# Patient Record
Sex: FEMALE | URBAN - METROPOLITAN AREA
[De-identification: names, ages, dates, MRNs, and addresses within clinical notes are randomized per-mention and may not be internally consistent; named-entity substitution may affect disease eponyms.]

---

## 2023-08-30 LAB
ALANINE AMINOTRANSFERASE (SGPT) (U/L) IN SER/PLAS: 18 U/L (ref 7–45)
ALBUMIN (G/DL) IN SER/PLAS: 4.1 G/DL (ref 3.4–5)
ALKALINE PHOSPHATASE (U/L) IN SER/PLAS: 118 U/L (ref 33–136)
ANION GAP IN SER/PLAS: 13 MMOL/L (ref 10–20)
ASPARTATE AMINOTRANSFERASE (SGOT) (U/L) IN SER/PLAS: 22 U/L (ref 9–39)
BILIRUBIN TOTAL (MG/DL) IN SER/PLAS: 1 MG/DL (ref 0–1.2)
CALCIUM (MG/DL) IN SER/PLAS: 10.1 MG/DL (ref 8.6–10.3)
CARBON DIOXIDE, TOTAL (MMOL/L) IN SER/PLAS: 30 MMOL/L (ref 21–32)
CHLORIDE (MMOL/L) IN SER/PLAS: 105 MMOL/L (ref 98–107)
CHOLESTEROL (MG/DL) IN SER/PLAS: 165 MG/DL (ref 0–199)
CHOLESTEROL IN HDL (MG/DL) IN SER/PLAS: 49.2 MG/DL
CHOLESTEROL/HDL RATIO: 3.4
CREATININE (MG/DL) IN SER/PLAS: 0.86 MG/DL (ref 0.5–1.05)
DIGOXIN (NG/ML) IN SER/PLAS: 0.55 NG/ML (ref 0.8–2)
GFR FEMALE: 68 ML/MIN/1.73M2
GLUCOSE (MG/DL) IN SER/PLAS: 124 MG/DL (ref 74–99)
LDL: 93 MG/DL (ref 0–99)
POTASSIUM (MMOL/L) IN SER/PLAS: 4.5 MMOL/L (ref 3.5–5.3)
PROTEIN TOTAL: 7.1 G/DL (ref 6.4–8.2)
SODIUM (MMOL/L) IN SER/PLAS: 143 MMOL/L (ref 136–145)
TRIGLYCERIDE (MG/DL) IN SER/PLAS: 112 MG/DL (ref 0–149)
UREA NITROGEN (MG/DL) IN SER/PLAS: 15 MG/DL (ref 6–23)
VLDL: 22 MG/DL (ref 0–40)

## 2023-10-01 DIAGNOSIS — I48.91 ATRIAL FIBRILLATION, UNSPECIFIED TYPE (MULTI): Primary | ICD-10-CM

## 2023-10-02 PROBLEM — I10 ESSENTIAL (PRIMARY) HYPERTENSION: Status: ACTIVE | Noted: 2023-10-02

## 2023-10-02 PROBLEM — I48.91 ATRIAL FIBRILLATION (MULTI): Status: ACTIVE | Noted: 2023-10-02

## 2023-10-02 PROBLEM — E78.2 MIXED HYPERLIPIDEMIA: Status: ACTIVE | Noted: 2023-10-02

## 2023-10-02 RX ORDER — ATORVASTATIN CALCIUM 80 MG/1
80 TABLET, FILM COATED ORAL DAILY
COMMUNITY
Start: 2015-04-07

## 2023-10-02 RX ORDER — CLOBETASOL PROPIONATE 0.5 MG/G
CREAM TOPICAL
COMMUNITY

## 2023-10-02 RX ORDER — DIGOXIN 125 MCG
125 TABLET ORAL EVERY OTHER DAY
Qty: 50 TABLET | Refills: 2 | Status: SHIPPED | OUTPATIENT
Start: 2023-10-02 | End: 2024-02-14 | Stop reason: SDUPTHER

## 2023-10-02 RX ORDER — DILTIAZEM HYDROCHLORIDE 300 MG/1
300 CAPSULE, EXTENDED RELEASE ORAL DAILY
COMMUNITY
Start: 2023-01-23 | End: 2023-12-18

## 2023-10-02 RX ORDER — APIXABAN 5 MG/1
5 TABLET, FILM COATED ORAL 2 TIMES DAILY
COMMUNITY
Start: 2020-01-14 | End: 2024-01-08 | Stop reason: SDUPTHER

## 2023-10-02 RX ORDER — FLUTICASONE PROPIONATE 50 MCG
1 SPRAY, SUSPENSION (ML) NASAL DAILY
COMMUNITY
Start: 2015-04-07

## 2023-10-02 RX ORDER — MINERAL OIL
1 ENEMA (ML) RECTAL DAILY
COMMUNITY

## 2023-10-02 RX ORDER — DIGOXIN 125 MCG
125 TABLET ORAL DAILY
COMMUNITY
Start: 2022-03-28 | End: 2024-02-14

## 2023-12-15 DIAGNOSIS — I48.91 ATRIAL FIBRILLATION, UNSPECIFIED TYPE (MULTI): ICD-10-CM

## 2023-12-15 DIAGNOSIS — I10 ESSENTIAL (PRIMARY) HYPERTENSION: Primary | ICD-10-CM

## 2023-12-18 RX ORDER — METOPROLOL TARTRATE 50 MG/1
50 TABLET ORAL
Qty: 200 TABLET | Refills: 2 | Status: SHIPPED | OUTPATIENT
Start: 2023-12-18 | End: 2024-03-04 | Stop reason: SDUPTHER

## 2023-12-18 RX ORDER — DILTIAZEM HYDROCHLORIDE 300 MG/1
300 CAPSULE, COATED, EXTENDED RELEASE ORAL DAILY
Qty: 100 CAPSULE | Refills: 2 | Status: SHIPPED | OUTPATIENT
Start: 2023-12-18 | End: 2024-03-04 | Stop reason: SDUPTHER

## 2024-01-08 ENCOUNTER — TELEPHONE (OUTPATIENT)
Dept: CARDIOLOGY | Facility: CLINIC | Age: 79
End: 2024-01-08

## 2024-01-08 DIAGNOSIS — I48.20 CHRONIC ATRIAL FIBRILLATION (MULTI): Primary | ICD-10-CM

## 2024-02-14 ENCOUNTER — TELEPHONE (OUTPATIENT)
Dept: CARDIOLOGY | Facility: CLINIC | Age: 79
End: 2024-02-14

## 2024-02-14 DIAGNOSIS — I48.91 ATRIAL FIBRILLATION, UNSPECIFIED TYPE (MULTI): ICD-10-CM

## 2024-02-14 RX ORDER — DIGOXIN 125 MCG
125 TABLET ORAL EVERY OTHER DAY
Qty: 45 TABLET | Refills: 1 | Status: SHIPPED | OUTPATIENT
Start: 2024-02-14

## 2024-03-04 ENCOUNTER — TELEPHONE (OUTPATIENT)
Dept: CARDIOLOGY | Facility: CLINIC | Age: 79
End: 2024-03-04

## 2024-03-04 DIAGNOSIS — I48.91 ATRIAL FIBRILLATION, UNSPECIFIED TYPE (MULTI): ICD-10-CM

## 2024-03-04 DIAGNOSIS — I10 ESSENTIAL (PRIMARY) HYPERTENSION: ICD-10-CM

## 2024-03-04 RX ORDER — DILTIAZEM HYDROCHLORIDE 300 MG/1
300 CAPSULE, COATED, EXTENDED RELEASE ORAL DAILY
Qty: 90 CAPSULE | Refills: 1 | Status: SHIPPED | OUTPATIENT
Start: 2024-03-04 | End: 2024-03-06 | Stop reason: SDUPTHER

## 2024-03-04 RX ORDER — METOPROLOL TARTRATE 50 MG/1
50 TABLET ORAL
Qty: 180 TABLET | Refills: 1 | Status: SHIPPED | OUTPATIENT
Start: 2024-03-04 | End: 2024-03-06 | Stop reason: SDUPTHER

## 2024-03-06 RX ORDER — METOPROLOL TARTRATE 50 MG/1
50 TABLET ORAL
Qty: 180 TABLET | Refills: 1 | Status: SHIPPED | OUTPATIENT
Start: 2024-03-06

## 2024-03-06 RX ORDER — DILTIAZEM HYDROCHLORIDE 300 MG/1
300 CAPSULE, COATED, EXTENDED RELEASE ORAL DAILY
Qty: 90 CAPSULE | Refills: 1 | Status: SHIPPED | OUTPATIENT
Start: 2024-03-06

## 2024-06-25 ENCOUNTER — TELEPHONE (OUTPATIENT)
Dept: CARDIOLOGY | Facility: CLINIC | Age: 79
End: 2024-06-25

## 2024-07-16 DIAGNOSIS — I48.20 CHRONIC ATRIAL FIBRILLATION (MULTI): ICD-10-CM

## 2024-07-16 RX ORDER — APIXABAN 5 MG/1
5 TABLET, FILM COATED ORAL 2 TIMES DAILY
Qty: 60 TABLET | Refills: 0 | Status: SHIPPED | OUTPATIENT
Start: 2024-07-16

## 2024-07-23 ENCOUNTER — OFFICE VISIT (OUTPATIENT)
Dept: CARDIOLOGY | Facility: CLINIC | Age: 79
End: 2024-07-23
Payer: COMMERCIAL

## 2024-07-23 VITALS — HEART RATE: 85 BPM | SYSTOLIC BLOOD PRESSURE: 156 MMHG | OXYGEN SATURATION: 95 % | DIASTOLIC BLOOD PRESSURE: 84 MMHG

## 2024-07-23 DIAGNOSIS — I87.2 CHRONIC VENOUS INSUFFICIENCY: Primary | ICD-10-CM

## 2024-07-23 DIAGNOSIS — I89.0 LYMPHEDEMA: ICD-10-CM

## 2024-07-23 PROCEDURE — 99213 OFFICE O/P EST LOW 20 MIN: CPT | Performed by: INTERNAL MEDICINE

## 2024-07-23 PROCEDURE — 1159F MED LIST DOCD IN RCRD: CPT | Performed by: INTERNAL MEDICINE

## 2024-07-23 PROCEDURE — 3077F SYST BP >= 140 MM HG: CPT | Performed by: INTERNAL MEDICINE

## 2024-07-23 PROCEDURE — 1160F RVW MEDS BY RX/DR IN RCRD: CPT | Performed by: INTERNAL MEDICINE

## 2024-07-23 PROCEDURE — 3079F DIAST BP 80-89 MM HG: CPT | Performed by: INTERNAL MEDICINE

## 2024-07-23 NOTE — PATIENT INSTRUCTIONS
I am referring you to lymphedema therapy, also known as complex decongestive therapy. Lymphedema therapy may include wrapping or bandaging of the legs to reduce swelling along with range of motion and breathing exercises and a light massage technique called manual lymphatic drainage that helps move fluid.      [facility] #[phone]  [id number] [address]  -upper and lower extremity, non-bariatric  -closed wounds    I want you to get more physically active. The CDC recommends 150 minutes of moderate intensity activity per week, broken up however you want to break it up. Moderate activity would be brisk walking, for instance. Most people find that 30 minutes five days a week works for them. Some people who work walk 15 minutes at their lunch break and 15 minutes in the evening. You can be creative.    Activities like swimming, biking, and running qualify as intense physical activity, in which case you only need to do 75 minutes a week.    Should you have questions, please do not hesitate to call my office at [phone], or you can reach me on [employer] if you have signed up for it. If you have urgent concerns, call the office, do not use [employer].

## 2024-07-23 NOTE — PROGRESS NOTES
Referred by self for chronic leg swelling    History of Present Illness:  Keesha Alberto is a/an 79 y.o. woman with chronic leg swelling who noticed worsening in the last several months. It's better first thing in the morning and worsens progressively throughout the day. She denies ulceration, weeping, and drainage.    She has a remote history of right leg DVT after hip replacement. Also with hypertension, hypothyroidism, atrial fibrillation on chronic anticoagulation. She has dealt with swelling intermittently for years. Used to see Dr. Alesha Davis in vascular medicine at the [facility] and was referred to complex decongestive therapy (lymphedema therapy).    She is wearing knee-high compression socks daily. She has done MLD in the past. She is pretty sedentary.       No past medical history on file.  No past surgical history on file.     No family history on file.  Current Outpatient Medications   Medication Sig Dispense Refill    atorvastatin (Lipitor) 80 mg tablet Take 1 tablet (80 mg) by mouth once daily.      clobetasol (Temovate) 0.05 % cream APPLY TWICE DAILY TO RASH ON ABDOMEN AND BACK      digoxin (Lanoxin) 125 MCG tablet Take 1 tablet (125 mcg) by mouth every other day. 45 tablet 1    dilTIAZem CD (Cardizem CD) 300 mg 24 hr capsule Take 1 capsule (300 mg) by mouth once daily. 90 capsule 1    Eliquis 5 mg tablet TAKE 1 TABLET BY MOUTH TWICE DAILY 60 tablet 0    fexofenadine (Allegra) 180 mg tablet Take 1 tablet (180 mg) by mouth once daily.      fluticasone (Flonase) 50 mcg/actuation nasal spray Administer 1 spray into each nostril once daily.      metoprolol tartrate (Lopressor) 50 mg tablet Take 1 tablet by mouth 2 times a day with meals. 180 tablet 1     No current facility-administered medications for this visit.       Physical Examination:  Blood pressure 156/84, pulse 85, SpO2 95%.  General: well-developed, well-nourished, no distress  Head: normocephalic, atraumatic  Eyes: PERRL, anicteric  sclerae, no conjunctival injection  ENT: normal oropharynx  Neck: supple, no carotid bruits, no JVD  Lungs: normal respiratory effort, clear to auscultation bilaterally  Heart: regular, normal S1 and S2, no murmurs, rubs or gallops  Abdomen: normal active bowel sounds, soft, non-distended, non-tender  Extremities: no cyanosis or clubbing  Vascular: bilateral mild edema with squaring of toes and mild dorsal foot swelling, scattered bilateral spider veins and mild hemosidering staining, pulses 2+ and symmetric  Musculoskeletal: no deformities  Neurological: alert and oriented, no gross neurological deficits  Psychological: normal mood and affect   Skin: warm and dry, no rashes or lesions        Pertinent Labs:    Pertinent Imaging:    Diagnoses and all orders for this visit:  Chronic venous insufficiency (Primary)  -     Referral to Physical Therapy; Future  Lymphedema  -     Referral to Physical Therapy; Future  Increase physical activity  Continue compression    Mariana Acosta MD, MS

## 2024-08-08 ENCOUNTER — APPOINTMENT (OUTPATIENT)
Dept: CARDIOLOGY | Facility: CLINIC | Age: 79
End: 2024-08-08
Payer: COMMERCIAL

## 2024-08-12 ENCOUNTER — TELEPHONE (OUTPATIENT)
Dept: CARDIOLOGY | Facility: CLINIC | Age: 79
End: 2024-08-12
Payer: COMMERCIAL

## 2024-08-12 DIAGNOSIS — I48.91 ATRIAL FIBRILLATION, UNSPECIFIED TYPE (MULTI): ICD-10-CM

## 2024-08-12 RX ORDER — DIGOXIN 125 MCG
125 TABLET ORAL EVERY OTHER DAY
Qty: 45 TABLET | Refills: 1 | Status: SHIPPED | OUTPATIENT
Start: 2024-08-12

## 2024-08-19 ENCOUNTER — TELEPHONE (OUTPATIENT)
Dept: CARDIOLOGY | Facility: CLINIC | Age: 79
End: 2024-08-19
Payer: MEDICARE

## 2024-08-19 DIAGNOSIS — I48.20 CHRONIC ATRIAL FIBRILLATION (MULTI): ICD-10-CM

## 2024-08-20 ENCOUNTER — APPOINTMENT (OUTPATIENT)
Dept: CARDIOLOGY | Facility: CLINIC | Age: 79
End: 2024-08-20
Payer: COMMERCIAL

## 2024-08-20 ENCOUNTER — OFFICE VISIT (OUTPATIENT)
Dept: CARDIOLOGY | Facility: CLINIC | Age: 79
End: 2024-08-20
Payer: MEDICARE

## 2024-08-20 VITALS
TEMPERATURE: 97.7 F | SYSTOLIC BLOOD PRESSURE: 152 MMHG | DIASTOLIC BLOOD PRESSURE: 87 MMHG | BODY MASS INDEX: 32.72 KG/M2 | WEIGHT: 215.9 LBS | HEIGHT: 68 IN | HEART RATE: 80 BPM

## 2024-08-20 DIAGNOSIS — I48.21 PERMANENT ATRIAL FIBRILLATION (MULTI): ICD-10-CM

## 2024-08-20 DIAGNOSIS — I48.91 ATRIAL FIBRILLATION, UNSPECIFIED TYPE (MULTI): Primary | ICD-10-CM

## 2024-08-20 DIAGNOSIS — E78.2 MIXED HYPERLIPIDEMIA: ICD-10-CM

## 2024-08-20 DIAGNOSIS — I48.20 CHRONIC ATRIAL FIBRILLATION (MULTI): ICD-10-CM

## 2024-08-20 DIAGNOSIS — I10 ESSENTIAL (PRIMARY) HYPERTENSION: ICD-10-CM

## 2024-08-20 PROBLEM — Z86.711 HISTORY OF PULMONARY EMBOLISM: Status: ACTIVE | Noted: 2024-08-20

## 2024-08-20 LAB
ATRIAL RATE: 40 BPM
Q ONSET: 224 MS
QRS COUNT: 11 BEATS
QRS DURATION: 90 MS
QT INTERVAL: 392 MS
QTC CALCULATION(BAZETT): 416 MS
QTC FREDERICIA: 408 MS
R AXIS: -18 DEGREES
T AXIS: -35 DEGREES
T OFFSET: 420 MS
VENTRICULAR RATE: 68 BPM

## 2024-08-20 PROCEDURE — 1036F TOBACCO NON-USER: CPT | Performed by: INTERNAL MEDICINE

## 2024-08-20 PROCEDURE — 1159F MED LIST DOCD IN RCRD: CPT | Performed by: INTERNAL MEDICINE

## 2024-08-20 PROCEDURE — 3079F DIAST BP 80-89 MM HG: CPT | Performed by: INTERNAL MEDICINE

## 2024-08-20 PROCEDURE — 3077F SYST BP >= 140 MM HG: CPT | Performed by: INTERNAL MEDICINE

## 2024-08-20 PROCEDURE — 93005 ELECTROCARDIOGRAM TRACING: CPT | Performed by: INTERNAL MEDICINE

## 2024-08-20 PROCEDURE — 1160F RVW MEDS BY RX/DR IN RCRD: CPT | Performed by: INTERNAL MEDICINE

## 2024-08-20 PROCEDURE — 99214 OFFICE O/P EST MOD 30 MIN: CPT | Performed by: INTERNAL MEDICINE

## 2024-08-20 RX ORDER — LEVOTHYROXINE SODIUM 88 UG/1
88 TABLET ORAL DAILY
COMMUNITY

## 2024-08-20 RX ORDER — DILTIAZEM HYDROCHLORIDE 300 MG/1
300 CAPSULE, COATED, EXTENDED RELEASE ORAL DAILY
Qty: 90 CAPSULE | Refills: 3 | Status: SHIPPED | OUTPATIENT
Start: 2024-08-20

## 2024-08-20 RX ORDER — LOSARTAN POTASSIUM 50 MG/1
50 TABLET ORAL 2 TIMES DAILY
Qty: 180 TABLET | Refills: 3 | Status: SHIPPED | OUTPATIENT
Start: 2024-08-20 | End: 2025-08-20

## 2024-08-20 RX ORDER — LOSARTAN POTASSIUM 50 MG/1
50 TABLET ORAL DAILY
COMMUNITY
End: 2024-08-20 | Stop reason: WASHOUT

## 2024-08-20 NOTE — PROGRESS NOTES
Chief Complaint:   Atrial Fibrillation     History of Present Illness     Keesha Alberto is a 79 y.o. female presenting with for routine follow-up of permanent atrial fibrillation.  The patient has been asymptomatic.  The patient has been rate-controlled in atrial fibrillation on medical treatment which they are tolerating well and are compliant.  The current treatment strategy is rate control.  The CHADS2-VASC2 score is 4  and the ACC/AHA guidelines recommend anticoagulation for stroke prophylaxis.  The patient is being treated with Eliquis and has tolerated treatment with no bleeding and is compliant.      Review of Systems  All pertinent systems have been reviewed and are negative except for what is stated in the history of present illness.    All other systems have been reviewed and are negative and noncontributory to this patient's current ailments.  .       Previous History     Past Medical History:  She has a past medical history of History of pulmonary embolism (08/20/2024) and Permanent atrial fibrillation (Multi) (08/20/2024).    Past Surgical History:  She has no past surgical history on file.      Social History:  She reports that she has never smoked. She has never used smokeless tobacco. She reports that she does not currently use alcohol. She reports that she does not use drugs.    Family History:  No family history on file.     Allergies:  Penicillins    Outpatient Medications:  Current Outpatient Medications   Medication Instructions    apixaban (ELIQUIS) 5 mg, oral, 2 times daily    atorvastatin (LIPITOR) 80 mg, oral, Daily    clobetasol (Temovate) 0.05 % cream APPLY TWICE DAILY TO RASH ON ABDOMEN AND BACK    digoxin (LANOXIN) 125 mcg, oral, Every other day    dilTIAZem CD (CARDIZEM CD) 300 mg, oral, Daily    fexofenadine (Allegra) 180 mg tablet 1 tablet, oral, Daily    fluticasone (Flonase) 50 mcg/actuation nasal spray 1 spray, Each Nostril, Daily    levothyroxine (SYNTHROID, LEVOXYL) 88 mcg,  "oral, Daily    losartan (COZAAR) 50 mg, oral, Daily    metoprolol tartrate (LOPRESSOR) 50 mg, oral, 2 times daily (morning and late afternoon)       Physical Examination   Vitals:  Visit Vitals  /87   Pulse 80   Temp 36.5 °C (97.7 °F)   Ht 1.727 m (5' 8\")   Wt 97.9 kg (215 lb 14.4 oz)   BMI 32.83 kg/m²   Smoking Status Never   BSA 2.17 m²    Physical Exam  Vitals reviewed.   Constitutional:       General: She is not in acute distress.     Appearance: Normal appearance.   HENT:      Head: Normocephalic and atraumatic.      Nose: Nose normal.   Eyes:      Conjunctiva/sclera: Conjunctivae normal.   Cardiovascular:      Rate and Rhythm: Normal rate. Rhythm irregularly irregular.      Pulses: Normal pulses.      Heart sounds: No murmur heard.  Pulmonary:      Effort: Pulmonary effort is normal. No respiratory distress.      Breath sounds: Normal breath sounds. No wheezing, rhonchi or rales.   Abdominal:      General: Bowel sounds are normal. There is no distension.      Palpations: Abdomen is soft.      Tenderness: There is no abdominal tenderness.   Musculoskeletal:         General: No swelling.      Right lower le+ Pitting Edema present.      Left lower le+ Pitting Edema present.   Skin:     General: Skin is warm and dry.      Capillary Refill: Capillary refill takes less than 2 seconds.   Neurological:      General: No focal deficit present.      Mental Status: She is alert.   Psychiatric:         Mood and Affect: Mood normal.             Labs/Imaging/Cardiac Studies     Last Labs:  CBC -  No results found for: \"WBC\", \"HGB\", \"HCT\", \"MCV\", \"PLT\"    CMP -  Lab Results   Component Value Date    CALCIUM 10.1 2023    PROT 7.1 2023    ALBUMIN 4.1 2023    AST 22 2023    ALT 18 2023    ALKPHOS 118 2023    BILITOT 1.0 2023       LIPID PANEL -   Lab Results   Component Value Date    CHOL 165 2023    HDL 49.2 2023    CHHDL 3.4 2023    VLDL 22 2023    " TRIG 112 08/30/2023       RENAL FUNCTION PANEL -   Lab Results   Component Value Date    K 4.5 08/30/2023       Lab Results   Component Value Date    HGBA1C 6.8 (H) 04/12/2024       ECG: AF    Echo:  No echocardiogram results found for the past 12 months       Assessment and Recommendations     Assessment/Plan     1. Permanent atrial fibrillation (Multi)  The patient has been clinically stable, asymptomatic with permanent, rate-controlled atrial fibrillation as detailed in the HPI.  They will continue treatment with rate-controlling medications and anticoagulation for stroke prophylaxis based upon their present [id number] score, the risks and benefits of which were discussed with the patient/family/caregiver.     2. Mixed hyperlipidemia  The patient's lipids are well controlled on chronic statin therapy and they are meeting their goal LDL cholesterol per the ACC/AHA guidelines.      3. Essential (primary) hypertension  Suboptimal control.  Increase ARB.     Keesha Alberto will return in 1 year for an office visit.       Víctor Bustos MD    Exclusive of any other services or procedures performed, I, Víctor Bustos MD , spent 30 minutes in duration for this visit today.  This time consisted of chart review, obtaining history, and/or performing the exam as documented above as well as documenting the clinical information for the encounter in the electronic record, discussing treatment options, plans, and/or goals with patient, family, and/or caregiver, refilling medications, updating the electronic record, ordering medicines, lab work, imaging, referrals, and/or procedures as documented above and communicating with other [facility]. I have discussed the results of laboratory, radiology, and cardiology studies with the patient and their family/caregiver.

## 2024-09-04 ENCOUNTER — TELEPHONE (OUTPATIENT)
Dept: CARDIOLOGY | Facility: CLINIC | Age: 79
End: 2024-09-04
Payer: MEDICARE

## 2024-09-04 DIAGNOSIS — I10 ESSENTIAL (PRIMARY) HYPERTENSION: ICD-10-CM

## 2024-09-04 RX ORDER — METOPROLOL TARTRATE 75 MG/1
75 TABLET, FILM COATED ORAL
Start: 2024-09-04

## 2024-09-04 NOTE — TELEPHONE ENCOUNTER
Pt contacted and verbalized understanding. Does not need refills at this time as she just picked up a new rx.

## 2024-09-05 ENCOUNTER — EVALUATION (OUTPATIENT)
Dept: PHYSICAL THERAPY | Facility: CLINIC | Age: 79
End: 2024-09-05
Payer: COMMERCIAL

## 2024-09-05 DIAGNOSIS — I89.0 LYMPHEDEMA: Primary | ICD-10-CM

## 2024-09-05 DIAGNOSIS — I87.2 VENOUS INSUFFICIENCY: ICD-10-CM

## 2024-09-05 DIAGNOSIS — I87.2 CHRONIC VENOUS INSUFFICIENCY: ICD-10-CM

## 2024-09-05 PROCEDURE — 97140 MANUAL THERAPY 1/> REGIONS: CPT | Mod: GP

## 2024-09-05 PROCEDURE — 97162 PT EVAL MOD COMPLEX 30 MIN: CPT | Mod: GP

## 2024-09-05 ASSESSMENT — PATIENT HEALTH QUESTIONNAIRE - PHQ9
SUM OF ALL RESPONSES TO PHQ9 QUESTIONS 1 AND 2: 0
2. FEELING DOWN, DEPRESSED OR HOPELESS: NOT AT ALL
1. LITTLE INTEREST OR PLEASURE IN DOING THINGS: NOT AT ALL

## 2024-09-05 ASSESSMENT — ENCOUNTER SYMPTOMS
DEPRESSION: 0
OCCASIONAL FEELINGS OF UNSTEADINESS: 0
LOSS OF SENSATION IN FEET: 0

## 2024-09-05 NOTE — PROGRESS NOTES
Physical Therapy  Lymphedema Evaluation    Patient Name: Keesha Alberto  MRN: [id number]  Today's Date: 9/5/2024  Time Calculation  Start Time: 1400  Stop Time: 1455  Time Calculation (min): 55 min  Today's Charges:     OT Therapeutic Procedures Time Entry  Manual Therapy Time Entry: 17                   Insurance:  Visit number: 1 of job needed  Authorization info: needed  Insurance Type: [facility]  Referred by: Dr. Acosta    Current Problem  1. Lymphedema  Follow Up In Physical Therapy    Referral to Physical Therapy      2. Venous insufficiency  Follow Up In Physical Therapy      3. Chronic venous insufficiency  Referral to Physical Therapy             Reason for visit: B LE lymphedema    Precautions:  Venous insufficiency  Hx breast cancer  DM II  Heart arrhythmia    Medical History Form: Reviewed (scanned into chart)    Subjective   Chief Complaint: Patient presents to clinic with B LE swelling greater than 10 years. She saw a lymphedema therapist at [facility] about 8 years ago, she had complete decongestion therapy at that time and has not needed it since. She was wearing a night garment and knee high compression stockings. She has had an exacerbation she can not control on her own x 1 month.  Onset Date: 8/5/24    Current Condition:   Worse    Pain:     Location: R shin, knee and ankle  0-1/10  Description: achey, tight and heavy  Aggravating Factors: later in the day  Relieving Factors: over night    Relevant Information (PMH & Previous Tests/Imaging):   Previous Interventions/Treatments: yes greater than 8 years ago    Lymphedema History:  Lymph Node Status:  wnl  Preceding Event: Slow Onset and Surgery  Current Compression: Yes, describe: has older knee high compression but not currently wearing it daily.            Compression Pump:   No     Does elevation help: Yes somewhat    Precautions:   Standard Precautions:  Cardiac precautions  Personal Factors Impacting Care: None .    Lymphedema Precautions  General: Lymphedema  MLD General: None  Neck MLD: Cardiac Arrhythmia controlled with Eliquis  Abdominal MLD:  None   Compression: Diabetes    Prior Level of Function (PLOF)  Patient previously independent with all ADLs  Exercise/Physical Activity: walks as able  Work/School: retired  Sleeps in: Standard flat bed  Patients Living Environment: [place] her [place].    Primary Language: English     Patient's Goal(s) for Therapy: Decrease swelling and improve self management.    Red Flags: Do you have any of the following? No  Fever/chills, unexplained weight changes, dizziness/fainting, unexplained change in bowel or bladder functions, unexplained malaise or muscle weakness, night pain/sweats, numbness or tingling    Objective   ADL's:    UB Bathing: Independent   UB Dressing: Independent   LB Bathing: Modified Independent   LB Dressing: Modified Independent     IADL's: wnl    Balance: Standing balance: good   Dynamic standing balance: good     Transfers: Modified Independent increased ue use     Gait: normal slow gait with cane    Assistive Device: cane     ROM: RLE:    minimally limited and LLE:   minimally limited    Strength: RLE: 4/5  and LLE: 4/5    Sensation: RLE:    Intact    and LLE:    Intact    Scar(s): Yes - from hip surgeries bilaterally    Posture: rounded shoulders and fwd head      Lymphedema Assessments:       Right Lower Extremity:  R Metatarsal (cm): 9.7 cm  R Heel Y Angle: 23.8 cm  R Ankle (cm): 28.5 cm  R 10 cm Above Ankle (cm): 29.2 cm  R 20 cm Above Ankle (cm): 37.4 cm  R 30 cm Above Ankle (cm): 42.5 cm  R 40 cm Above Ankle (cm): 0 cm  R Knee (cm): 41.8 cm  R 10 cm Above Knee (cm): 0 cm  R 20 cm Above Knee (cm): 0 cm  R 30 cm Above Knee (cm): 0 cm  R 40 cm Above Knee (cm): 0 cm  R 50 cm Above Knee (cm): 0 cm  Right lower extremity total: 212.9  Left Lower Extremity:  L Metatarsal (cm): 8.3 cm  L Heel Y Angle: 22.5 cm  L Ankle (cm): 29.2 cm  L 10 cm Above Ankle  (cm): 28.3 cm  L 20 cm Above Ankle (cm): 36.1 cm  L 30 cm Above Ankle (cm): 41.3 cm  L 40 cm Above Ankle (cm): 0 cm  L Knee (cm): 39.4 cm  L 10 cm Above Knee (cm): 0 cm  L 20 cm Above Knee (cm): 0 cm  L 30 cm Above Knee (cm): 0 cm  L 40 cm Above Knee (cm): 0 cm  L 50 cm Above Knee (cm): 0 cm  Left Lower extremity total: 205.1  LE Skin Appearance/Condition and Girth:   Pitting edema, trophic changes, hemosiderin staining.           Outcome Measures:   Other Measures  Lymphedema Life Impact Scale (LLIS): 29.41 (eval)  EDUCATION:   Individual(s) Educated: patient Keesha Alberto  Education Provided: Education: Anatomy and Physiology, Diagnosis and Precautions, Lymphedema, Symptom Management, and Other compression recommendations  Handout(s) Provided: Scanned into chart  Home Program: self MLD and handout issued  Risk and Benefits Discussed with Patient/Caregiver/Other: Yes   Patient/Caregiver Demonstrated Understanding: Yes   Plan of Care Discussed and Agreed Upon: Yes   Patient Response to Education: Patient/Caregiver verbalized understanding of information, Patient/Caregiver performed return demonstration of exercises/activities, and Patient/Caregiver asked appropriate questions    TREATMENT:    Manual:   MLD performed to B LE's x 1 by therapist with return demo by pt.    Self Care:  Educated to wear compression daily, consistently      Problems:  Problems to be addressed: Pain, ROM impaired, Mobility impaired , Gait impaired, Needs garment fitting, and Impaired lymph flow decreased strength.    Rehab Potential: Good    Assessment: Patient presents with Secondary Lymphedema stage 2, resulting in bilateral leg lymphedema . Pt would benefit from complete decongestive therapy treatment to reduce volume and manage condition to prevent infection, reduce pain, improve mobility, improve independence, and improve self management of condition. Recommend an inelastic velcro wrap for foot/ankle and calf, pump for LE's and new  knee high compression stockings when in maintenance phase.       Clinical presentation:      Evolving with changing characteristics,         Complexity:   . Moderate complexity due to patient's clinical presentation being evolving with changing characteristics, with comorbidities/complexities to include DM II, venous insufficiency, all of which may negatively impact rehab tolerance and progression.     Plan:     Planned Interventions include: ADL training, Therapeutic Exercise, Therapeutic Activity, Manual therapy, Manual Lymph Drainage, Compression measuring and fitting, Vasopneumatic pump, Patient and/or Caregiver Training, Self MLD training, HEP, and Garment measuring and fitting  Frequency: 1-2 x Week  Duration: 6 Months    Goals: Set and discussed today  Active       PT Problem       Pt will have improved LLIS by 10% or greater indicating improving function       Start:  09/05/24    Expected End:  03/01/25            Pt will have reduced girth in B LE's by 10-20 cm total girth for improved LE shape and fit of compression garments.       Start:  09/05/24    Expected End:  03/01/25            Pt will be independent with self MLD to promote improved lymph flow.       Start:  09/05/24    Expected End:  03/01/25            Pt will be independent with donning inelastic and compression garments for optimal girth reduction.       Start:  09/05/24    Expected End:  03/01/25                Plan of care was developed with input and agreement by the patient.    Verification of benefits sent for: Garment Options and Vasopneumatic Pump     Domonique Durant, PT

## 2024-09-05 NOTE — LETTER
September 5, 2024    [place]  [id number] [address]  [facility] [place] [address]    Patient: Keesha Alberto   YOB: 1945   Date of Visit: 9/5/2024       [place]  [id number]  [facility] [place] [address]    The attached plan of care is being sent to you because your patient’s medical reimbursement requires that you certify the plan of care. Your signature is required to allow uninterrupted insurance coverage.      You may indicate your approval by signing below and faxing this form back to us at Dept Fax: [phone].    Please call Dept: [phone] with any questions or concerns.    Thank you for this referral,        Domonique Durant, PT  [facility] [address] [place] [id number]    Payer: Payor: [facility] / Plan: [facility] / Product Type: *No Product type* /                                                                         Date:     Dear Domonique Durant, PT,     Re: Ms. Keesha Alberto, MRN:[id number]    I certify that I have reviewed the attached plan of care and it is medically necessary for Ms. Keesha Alberto (1945) who is under my care.          ______________________________________                    _________________  Provider name and credentials                                           Date and time                                                                                           Plan of Care 9/5/24   Effective from: 9/5/2024  Effective to: 3/1/2025    Plan ID: [id number]                Participants as of Finalize on 9/5/2024      Name Type Comments Contact Info    Mariana Acosta MD, MS Referring Provider  [phone]    Domonique Durant, PT Physical Therapist  [phone]           Last Plan Note       Author: Domonique Durant PT Status: Incomplete Last edited: 9/5/2024   1:45 PM           Physical Therapy  Lymphedema Evaluation    Patient Name: Keesha Alberto  MRN: [id number]  Today's Date: 9/5/2024  Time Calculation  Start Time: 1400  Stop Time: 1455  Time Calculation (min): 55 min  Today's Charges:     OT Therapeutic Procedures Time Entry  Manual Therapy Time Entry: 17                   Insurance:  Visit number: 1 of job needed  Authorization info: needed  Insurance Type: [facility]  Referred by: Dr. Acosta    Current Problem  1. Lymphedema  Follow Up In Physical Therapy    Referral to Physical Therapy      2. Venous insufficiency  Follow Up In Physical Therapy      3. Chronic venous insufficiency  Referral to Physical Therapy             Reason for visit: B LE lymphedema    Precautions:  Venous insufficiency  Hx breast cancer  DM II  Heart arrhythmia    Medical History Form: Reviewed (scanned into chart)    Subjective  Chief Complaint: Patient presents to clinic with B LE swelling greater than 10 years. She saw a lymphedema therapist at [facility] about 8 years ago, she had complete decongestion therapy at that time and has not needed it since. She was wearing a night garment and knee high compression stockings. She has had an exacerbation she can not control on her own x 1 month.  Onset Date: 8/5/24    Current Condition:   Worse    Pain:     Location: R shin, knee and ankle  0-1/10  Description: achey, tight and heavy  Aggravating Factors: later in the day  Relieving Factors: over night    Relevant Information (PMH & Previous Tests/Imaging):   Previous Interventions/Treatments: yes greater than 8 years ago    Lymphedema History:  Lymph Node Status:  wnl  Preceding Event: Slow Onset and Surgery  Current Compression: Yes, describe: has older knee high compression but not currently wearing it daily.            Compression Pump:   No     Does elevation help: Yes somewhat    Precautions:   Standard Precautions:  Cardiac precautions  Personal Factors Impacting  Care: None .   Lymphedema Precautions  General: Lymphedema  MLD General: None  Neck MLD: Cardiac Arrhythmia controlled with Eliquis  Abdominal MLD:  None   Compression: Diabetes    Prior Level of Function (PLOF)  Patient previously independent with all ADLs  Exercise/Physical Activity: walks as able  Work/School: retired  Sleeps in: Standard flat bed  Patients Living Environment: [place] her [place].    Primary Language: English     Patient's Goal(s) for Therapy: Decrease swelling and improve self management.    Red Flags: Do you have any of the following? No  Fever/chills, unexplained weight changes, dizziness/fainting, unexplained change in bowel or bladder functions, unexplained malaise or muscle weakness, night pain/sweats, numbness or tingling    Objective  ADL's:    UB Bathing: Independent   UB Dressing: Independent   LB Bathing: Modified Independent   LB Dressing: Modified Independent     IADL's: wnl    Balance: Standing balance: good   Dynamic standing balance: good     Transfers: Modified Independent increased ue use     Gait: normal slow gait with cane    Assistive Device: cane     ROM: RLE:    minimally limited and LLE:   minimally limited    Strength: RLE: 4/5  and LLE: 4/5    Sensation: RLE:    Intact    and LLE:    Intact    Scar(s): Yes - from hip surgeries bilaterally    Posture: rounded shoulders and fwd head      Lymphedema Assessments:       Right Lower Extremity:  R Metatarsal (cm): 9.7 cm  R Heel Y Angle: 23.8 cm  R Ankle (cm): 28.5 cm  R 10 cm Above Ankle (cm): 29.2 cm  R 20 cm Above Ankle (cm): 37.4 cm  R 30 cm Above Ankle (cm): 42.5 cm  R 40 cm Above Ankle (cm): 0 cm  R Knee (cm): 41.8 cm  R 10 cm Above Knee (cm): 0 cm  R 20 cm Above Knee (cm): 0 cm  R 30 cm Above Knee (cm): 0 cm  R 40 cm Above Knee (cm): 0 cm  R 50 cm Above Knee (cm): 0 cm  Right lower extremity total: 212.9  Left Lower Extremity:  L Metatarsal (cm): 8.3 cm  L Heel Y Angle: 22.5 cm  L Ankle (cm): 29.2 cm  L 10 cm  Above Ankle (cm): 28.3 cm  L 20 cm Above Ankle (cm): 36.1 cm  L 30 cm Above Ankle (cm): 41.3 cm  L 40 cm Above Ankle (cm): 0 cm  L Knee (cm): 39.4 cm  L 10 cm Above Knee (cm): 0 cm  L 20 cm Above Knee (cm): 0 cm  L 30 cm Above Knee (cm): 0 cm  L 40 cm Above Knee (cm): 0 cm  L 50 cm Above Knee (cm): 0 cm  Left Lower extremity total: 205.1  LE Skin Appearance/Condition and Girth:   Pitting edema, trophic changes, hemosiderin staining.           Outcome Measures:   Other Measures  Lymphedema Life Impact Scale (LLIS): 29.41 (eval)  EDUCATION:   Individual(s) Educated: patient Keesha Alberto  Education Provided: Education: Anatomy and Physiology, Diagnosis and Precautions, Lymphedema, Symptom Management, and Other compression recommendations  Handout(s) Provided: Scanned into chart  Home Program: self MLD and handout issued  Risk and Benefits Discussed with Patient/Caregiver/Other: Yes   Patient/Caregiver Demonstrated Understanding: Yes   Plan of Care Discussed and Agreed Upon: Yes   Patient Response to Education: Patient/Caregiver verbalized understanding of information, Patient/Caregiver performed return demonstration of exercises/activities, and Patient/Caregiver asked appropriate questions    TREATMENT:    Manual:   MLD performed to B LE's x 1 by therapist with return demo by pt.    Self Care:  Educated to wear compression daily, consistently      Problems:  Problems to be addressed: Pain, ROM impaired, Mobility impaired , Gait impaired, Needs garment fitting, and Impaired lymph flow decreased strength.    Rehab Potential: Good    Assessment: Patient presents with Secondary Lymphedema stage 2, resulting in bilateral leg lymphedema . Pt would benefit from complete decongestive therapy treatment to reduce volume and manage condition to prevent infection, reduce pain, improve mobility, improve independence, and improve self management of condition. Recommend an inelastic velcro wrap for foot/ankle and calf, pump for  LE's and new knee high compression stockings when in maintenance phase.       Clinical presentation:      Evolving with changing characteristics,         Complexity:   . Moderate complexity due to patient's clinical presentation being evolving with changing characteristics, with comorbidities/complexities to include DM II, venous insufficiency, all of which may negatively impact rehab tolerance and progression.     Plan:     Planned Interventions include: ADL training, Therapeutic Exercise, Therapeutic Activity, Manual therapy, Manual Lymph Drainage, Compression measuring and fitting, Vasopneumatic pump, Patient and/or Caregiver Training, Self MLD training, HEP, and Garment measuring and fitting  Frequency: 1-2 x Week  Duration: 6 Months    Goals: Set and discussed today  Active       PT Problem       Pt will have improved LLIS by 10% or greater indicating improving function       Start:  09/05/24    Expected End:  03/01/25            Pt will have reduced girth in B LE's by 10-20 cm total girth for improved LE shape and fit of compression garments.       Start:  09/05/24    Expected End:  03/01/25            Pt will be independent with self MLD to promote improved lymph flow.       Start:  09/05/24    Expected End:  03/01/25            Pt will be independent with donning inelastic and compression garments for optimal girth reduction.       Start:  09/05/24    Expected End:  03/01/25                Plan of care was developed with input and agreement by the patient.    Verification of benefits sent for: Garment Options and Vasopneumatic Pump     Domonique Durant PT            Current Participants as of 9/5/2024      Name Type Comments Contact Info    Mariana Acosta MD, MS Referring Provider  [phone]    Signature pending    Domonique Durant PT Physical Therapist  [phone]

## 2024-09-12 ENCOUNTER — TELEPHONE (OUTPATIENT)
Dept: CARDIOLOGY | Facility: CLINIC | Age: 79
End: 2024-09-12
Payer: COMMERCIAL

## 2024-09-19 ENCOUNTER — TREATMENT (OUTPATIENT)
Dept: PHYSICAL THERAPY | Facility: CLINIC | Age: 79
End: 2024-09-19
Payer: COMMERCIAL

## 2024-09-19 DIAGNOSIS — I87.2 VENOUS INSUFFICIENCY: ICD-10-CM

## 2024-09-19 DIAGNOSIS — I89.0 LYMPHEDEMA: ICD-10-CM

## 2024-09-19 PROCEDURE — 97140 MANUAL THERAPY 1/> REGIONS: CPT | Mod: GP

## 2024-09-19 PROCEDURE — 97535 SELF CARE MNGMENT TRAINING: CPT | Mod: GP

## 2024-09-19 NOTE — PROGRESS NOTES
Physical Therapy Treatment    Patient Name: Keesha Alberto  MRN: [id number]  Today's Date: 9/19/2024   Visit 2/16  Auth: Approved 16 visits 9/5/24-11/28/24   Referred by: Dave  Time Calculation  Start Time: 1520  Stop Time: 1620  Time Calculation (min): 60 min  Diagnosis:   1. Lymphedema  Follow Up In Physical Therapy      2. Venous insufficiency  Follow Up In Physical Therapy             PT Therapeutic Procedures Time Entry  Manual Therapy Time Entry: 45  Self-Care/Home Mgmt Training: 15                   Precautions:  Venous insufficiency  Hx breast cancer  DM II  Heart arrhythmia    SUBJECTIVE:  Is off Losartan digoxin  On metoprolol, Metformin  Has been wearing  compression stockings and night garment on consistently since last visit.  Pain level: 24.7/10    HEP compliance: good, wearing knee high compression.    OBJECTIVE:  Objective                   Lymphedema Assessments:        Right Lower Extremity:  R Metatarsal (cm): 9.2 cm  R Heel Y Angle: 24 cm  R Ankle (cm): 27.9 cm  R 10 cm Above Ankle (cm): 24.7 cm  R 20 cm Above Ankle (cm): 34.1 cm  R 30 cm Above Ankle (cm): 41.7 cm  R 40 cm Above Ankle (cm): 0 cm  R Knee (cm): 42.3 cm  R 10 cm Above Knee (cm): 52.9 cm  R 20 cm Above Knee (cm): 0 cm  R 30 cm Above Knee (cm): 0 cm  R 40 cm Above Knee (cm): 0 cm  R 50 cm Above Knee (cm): 0 cm  Right lower extremity total: 256.8  Left Lower Extremity:  L Metatarsal (cm): 8.2 cm  L Heel Y Angle: 22.9 cm  L Ankle (cm): 27.7 cm  L 10 cm Above Ankle (cm): 24.7 cm  L 20 cm Above Ankle (cm): 35.4 cm  L 30 cm Above Ankle (cm): 40 cm  L 40 cm Above Ankle (cm): 0 cm  L Knee (cm): 38.4 cm  L 10 cm Above Knee (cm): 0 cm  L 20 cm Above Knee (cm): 49.9 cm  L 30 cm Above Knee (cm): 0 cm  L 40 cm Above Knee (cm): 0 cm  L 50 cm Above Knee (cm): 0 cm  Left Lower extremity total: 247.2  LE Skin Appearance/Condition and Girth:   R LE total girth without above knee measurement 203.9 cm indicating reduction by 1 cm.  L LE total girth  without above knee measurement 197.3 cm indicating reduction of 7.8 cm.  Swelling in posterior R knee palpable.        Treatment:             - Therapeutic Exercise:     - Manual Therapy:  MLD x 2 trunk and each LE.   Measured for circaid juxtalite afw size M x 2 and Sigvaris compreflex transition calf wrap size Medium and length long.    - Self Care:  Education on reduction phase and maintenance phase and the role of velcro wraps for de-congestion versus knee high compression for maintenance.    ASSESSMENT:  Patient presents with Secondary Lymphedema stage 2, resulting in bilateral leg lymphedema . Reviewed MLD, completed MLD , ordered inelastic ankle and calf wraps. Pt may need knee sleeve on R UE.      PLAN:  Reduce girth for improved shape. Work on donning compression.      GOALS:  Active       PT Problem       Pt will have improved LLIS by 10% or greater indicating improving function       Start:  09/05/24    Expected End:  03/01/25            Pt will have reduced girth in B LE's by 10-20 cm total girth for improved LE shape and fit of compression garments.       Start:  09/05/24    Expected End:  03/01/25            Pt will be independent with self MLD to promote improved lymph flow.       Start:  09/05/24    Expected End:  03/01/25            Pt will be independent with donning inelastic and compression garments for optimal girth reduction.       Start:  09/05/24    Expected End:  03/01/25

## 2024-09-23 ENCOUNTER — TREATMENT (OUTPATIENT)
Dept: PHYSICAL THERAPY | Facility: CLINIC | Age: 79
End: 2024-09-23
Payer: COMMERCIAL

## 2024-09-23 DIAGNOSIS — I89.0 LYMPHEDEMA: ICD-10-CM

## 2024-09-23 DIAGNOSIS — I87.2 VENOUS INSUFFICIENCY: ICD-10-CM

## 2024-09-23 PROCEDURE — 97535 SELF CARE MNGMENT TRAINING: CPT | Mod: GP

## 2024-09-23 PROCEDURE — 97140 MANUAL THERAPY 1/> REGIONS: CPT | Mod: GP

## 2024-09-23 NOTE — PROGRESS NOTES
Physical Therapy Treatment    Patient Name: Keesha Alberto  MRN: [id number]  Today's Date: 9/23/2024   Visit 3/16  Auth: Approved 16 visits 9/5/24-11/28/24   Referred by: Dave    Time Calculation  Start Time: 1030  Stop Time: 1125  Time Calculation (min): 55 min  Diagnosis:   1. Lymphedema  Follow Up In Physical Therapy      2. Venous insufficiency  Follow Up In Physical Therapy             PT Therapeutic Procedures Time Entry  Manual Therapy Time Entry: 45  Self-Care/Home Mgmt Training: 10                   Precautions:  Venous insufficiency  Hx breast cancer  DM II  Heart arrhythmia    SUBJECTIVE:  Calves sore.  Pain level: 0/10    HEP compliance: good    OBJECTIVE:  Objective                 Lymphedema Assessments:        Right Lower Extremity:  R Metatarsal (cm): 8.9 cm  R Heel Y Angle: 23.8 cm  R Ankle (cm): 28.2 cm  R 10 cm Above Ankle (cm): 25.9 cm  R 20 cm Above Ankle (cm): 36.6 cm  R 30 cm Above Ankle (cm): 42.3 cm  R 40 cm Above Ankle (cm): 0 cm  R Knee (cm): 42.3 cm  R 10 cm Above Knee (cm): 52.8 cm  R 20 cm Above Knee (cm): 0 cm  R 30 cm Above Knee (cm): 0 cm  R 40 cm Above Knee (cm): 0 cm  R 50 cm Above Knee (cm): 0 cm  Right lower extremity total: 260.8  Increased 4 cm more proximal.    Left Lower Extremity:  L Metatarsal (cm): 8.3 cm  L Heel Y Angle: 22.9 cm  L Ankle (cm): 29.2 cm  L 10 cm Above Ankle (cm): 25.4 cm  L 20 cm Above Ankle (cm): 35.3 cm  L 30 cm Above Ankle (cm): 41.2 cm  L 40 cm Above Ankle (cm): 0 cm  L Knee (cm): 39.4 cm  L 10 cm Above Knee (cm): 0 cm  L 20 cm Above Knee (cm): 51.6 cm  L 30 cm Above Knee (cm): 0 cm  L 40 cm Above Knee (cm): 0 cm  L 50 cm Above Knee (cm): 0 cm  Left Lower extremity total: 253.3  Increased 6.1 cm proximally    LE Skin Appearance/Condition and Girth:   Skin de-congesting with more dryness presents.         Treatment:             - Therapeutic Exercise:     - Manual Therapy:  MLD x 2 trunk and each LE.   Increased time and focus on ankles to reduce  girth.     - Self Care:  Education on donning compression stockings by pulling stockings inside out to heels then donning.    ASSESSMENT:  Patient presents with Secondary Lymphedema stage 2, resulting in bilateral leg lymphedema . Education on donning knee high compression garments.    PLAN:  Reduce ankle girth and congestion.      GOALS:  Active       PT Problem       Pt will have improved LLIS by 10% or greater indicating improving function       Start:  09/05/24    Expected End:  03/01/25            Pt will have reduced girth in B LE's by 10-20 cm total girth for improved LE shape and fit of compression garments.       Start:  09/05/24    Expected End:  03/01/25            Pt will be independent with self MLD to promote improved lymph flow.       Start:  09/05/24    Expected End:  03/01/25            Pt will be independent with donning inelastic and compression garments for optimal girth reduction.       Start:  09/05/24    Expected End:  03/01/25

## 2024-10-15 ENCOUNTER — TREATMENT (OUTPATIENT)
Dept: PHYSICAL THERAPY | Facility: CLINIC | Age: 79
End: 2024-10-15
Payer: COMMERCIAL

## 2024-10-15 DIAGNOSIS — I87.2 VENOUS INSUFFICIENCY: ICD-10-CM

## 2024-10-15 DIAGNOSIS — I89.0 LYMPHEDEMA: Primary | ICD-10-CM

## 2024-10-15 PROCEDURE — 97140 MANUAL THERAPY 1/> REGIONS: CPT | Mod: GP

## 2024-10-15 PROCEDURE — 97110 THERAPEUTIC EXERCISES: CPT | Mod: GP

## 2024-10-15 NOTE — PROGRESS NOTES
Physical Therapy Treatment    Patient Name: Keesha Alberto  MRN: [id number]  Today's Date: 10/15/2024   Visit 4/ 16  Auth: Approved 16 visits 9/5/24-11/28/24   Referred by: Dave  Time Calculation  Start Time: 1515  Stop Time: 1615  Time Calculation (min): 60 min  Diagnosis:   1. Lymphedema  Follow Up In Physical Therapy      2. Venous insufficiency  Follow Up In Physical Therapy        Problem List Items Addressed This Visit             ICD-10-CM       Cardiac and Vasculature    Venous insufficiency I87.2       Symptoms and Signs    Lymphedema - Primary I89.0          PT Therapeutic Procedures Time Entry  Manual Therapy Time Entry: 52  Therapeutic Exercise Time Entry: 8                   Precautions:  Venous insufficiency  Hx breast cancer  DM II  Heart arrhythmia  SUBJECTIVE:  Pt wearing knee high compression. Pt got a call from [employer] but could not understand the caller and the caller marked her as turning down the product.    Pain level: calf and knee on R 1/10 sore    HEP compliance: good wearing knee high compression, completes MLD, exercises.    OBJECTIVE:  Objective               Lymphedema Assessments:          Right Lower Extremity:  R Metatarsal (cm): 8.7 cm  R Heel Y Angle: 24.4 cm  R Ankle (cm): 28 cm  R 10 cm Above Ankle (cm): 25 cm  R 20 cm Above Ankle (cm): 35 cm  R 30 cm Above Ankle (cm): 42.2 cm  R 40 cm Above Ankle (cm): 0 cm  R Knee (cm): 40.8 cm  R 10 cm Above Knee (cm): 52 cm  R 20 cm Above Knee (cm): 0 cm  R 30 cm Above Knee (cm): 0 cm  R 40 cm Above Knee (cm): 0 cm  R 50 cm Above Knee (cm): 0 cm  Right lower extremity total: 256.1  Left Lower Extremity:  L Metatarsal (cm): 8.4 cm  L Heel Y Angle: 23 cm  L Ankle (cm): 28 cm  L 10 cm Above Ankle (cm): 24.6 cm  L 20 cm Above Ankle (cm): 35.1 cm  L 30 cm Above Ankle (cm): 39.7 cm  L 40 cm Above Ankle (cm): 0 cm  L Knee (cm): 40 cm  L 10 cm Above Knee (cm): 0 cm  L 20 cm Above Knee (cm): 49.8 cm  L 30 cm Above Knee (cm): 0 cm  L 40 cm  "Above Knee (cm): 0 cm  L 50 cm Above Knee (cm): 0 cm  Left Lower extremity total: 248.6  LE Skin Appearance/Condition and Girth:        Girth down in both LE's.  Swelling evident above calves this date where pt not wearing compression.  Palpable swelling in B ankles.           Treatment:             - Therapeutic Exercise:   Access Code: [email or web address]  URL: [email or web address]  Date: 10/15/2024  Prepared by: Domonique Blas    Calf/hamstring stretch to R LE for posterior R knee pain. 30\"x 2.    - Manual Therapy:  MLD to B LE's x 4 with increased focus on proximal trunk and thighs.    ASSESSMENT:  Patient presents with Secondary Lymphedema stage 2, resulting in bilateral leg lymphedema . Pt with decreased swelling bilaterally in feet and calves. Thighs visibly swollen.     DME update: therapist contacted [facility] to reverse pt turning down the order d/t misunderstanding.  PLAN:  Assess for abdominal swelling and work on proximal clearing and diaphragamatic breathing.      GOALS:  Active       PT Problem       Pt will have improved LLIS by 10% or greater indicating improving function       Start:  09/05/24    Expected End:  03/01/25            Pt will have reduced girth in B LE's by 10-20 cm total girth for improved LE shape and fit of compression garments.       Start:  09/05/24    Expected End:  03/01/25            Pt will be independent with self MLD to promote improved lymph flow.       Start:  09/05/24    Expected End:  03/01/25            Pt will be independent with donning inelastic and compression garments for optimal girth reduction.       Start:  09/05/24    Expected End:  03/01/25                  "

## 2024-10-18 ENCOUNTER — APPOINTMENT (OUTPATIENT)
Dept: PHYSICAL THERAPY | Facility: CLINIC | Age: 79
End: 2024-10-18
Payer: COMMERCIAL

## 2024-10-25 ENCOUNTER — TREATMENT (OUTPATIENT)
Dept: PHYSICAL THERAPY | Facility: CLINIC | Age: 79
End: 2024-10-25
Payer: COMMERCIAL

## 2024-10-25 DIAGNOSIS — I89.0 LYMPHEDEMA: ICD-10-CM

## 2024-10-25 DIAGNOSIS — I87.2 VENOUS INSUFFICIENCY: ICD-10-CM

## 2024-10-25 PROCEDURE — 97140 MANUAL THERAPY 1/> REGIONS: CPT | Mod: GP

## 2024-10-25 PROCEDURE — 97535 SELF CARE MNGMENT TRAINING: CPT | Mod: GP

## 2024-10-25 NOTE — PROGRESS NOTES
"  Physical Therapy Treatment    Patient Name: Keesha Alberto  MRN: [id number]  Today's Date: 10/25/2024   Visit   5/16  Auth: Approved 16 visits 9/5/24-11/28/24   Referred by: Dave  Time Calculation  Start Time: 1115  Stop Time: 1205  Time Calculation (min): 50 min  Diagnosis:   1. Lymphedema  Follow Up In Physical Therapy      2. Venous insufficiency  Follow Up In Physical Therapy        Problem List Items Addressed This Visit             ICD-10-CM       Cardiac and Vasculature    Venous insufficiency I87.2       Symptoms and Signs    Lymphedema I89.0          PT Therapeutic Procedures Time Entry  Manual Therapy Time Entry: 35  Self-Care/Home Mgmt Training: 15                   Precautions:  Venous insufficiency  Hx breast cancer  DM II  Heart arrhythmia    SUBJECTIVE:  Pt has been wearing B LE knee highs.     Pain level: calf and knee on R LE 1/10 \"crackey.\"    HEP compliance: good    OBJECTIVE:  Objective               Lymphedema Assessments:          Right Lower Extremity:  R Metatarsal (cm): 8.6 cm  R Heel Y Angle: 23.4 cm  R Ankle (cm): 27.9 cm  R 10 cm Above Ankle (cm): 25.2 cm  R 20 cm Above Ankle (cm): 35 cm  R 30 cm Above Ankle (cm): 41.2 cm  R 40 cm Above Ankle (cm): 0 cm  R Knee (cm): 40.7 cm  R 10 cm Above Knee (cm): 50.9 cm  R 20 cm Above Knee (cm): 0 cm  R 30 cm Above Knee (cm): 0 cm  R 40 cm Above Knee (cm): 0 cm  R 50 cm Above Knee (cm): 0 cm  Right lower extremity total: 252.9  Left Lower Extremity:  L Metatarsal (cm): 8.1 cm  L Heel Y Angle: 22.7 cm  L Ankle (cm): 28 cm  L 10 cm Above Ankle (cm): 24.5 cm  L 20 cm Above Ankle (cm): 34.6 cm  L 30 cm Above Ankle (cm): 39.6 cm  L 40 cm Above Ankle (cm): 0 cm  L Knee (cm): 39.7 cm  L 10 cm Above Knee (cm): 0 cm  L 20 cm Above Knee (cm): 49.1 cm  L 30 cm Above Knee (cm): 0 cm  L 40 cm Above Knee (cm): 0 cm  L 50 cm Above Knee (cm): 0 cm  Left Lower extremity total: 246.3  LE Skin Appearance/Condition and Girth:             Treatment:             - " Therapeutic Exercise:     - Manual Therapy:  MLD to trunk and  B LE's x 3  - Self Care:  Edu on foot/ankle wrap and how it can help with reducing ankle girth.  Discussed daily use and ok to wear 24 hours.    ASSESSMENT:  Patient presents with Secondary Lymphedema stage 2, resulting in bilateral leg lymphedema . Pt LE swelling continues to decrease. Congestion persists in ankles.     PLAN:  Reduce girth in LE's, practice don/doff velcro wraps once received.      GOALS:  Active       PT Problem       Pt will have improved LLIS by 10% or greater indicating improving function       Start:  09/05/24    Expected End:  03/01/25            Pt will have reduced girth in B LE's by 10-20 cm total girth for improved LE shape and fit of compression garments.       Start:  09/05/24    Expected End:  03/01/25            Pt will be independent with self MLD to promote improved lymph flow.       Start:  09/05/24    Expected End:  03/01/25            Pt will be independent with donning inelastic and compression garments for optimal girth reduction.       Start:  09/05/24    Expected End:  03/01/25

## 2024-11-08 ENCOUNTER — TREATMENT (OUTPATIENT)
Dept: PHYSICAL THERAPY | Facility: CLINIC | Age: 79
End: 2024-11-08
Payer: COMMERCIAL

## 2024-11-08 DIAGNOSIS — I87.2 VENOUS INSUFFICIENCY: ICD-10-CM

## 2024-11-08 DIAGNOSIS — I89.0 LYMPHEDEMA: ICD-10-CM

## 2024-11-08 PROCEDURE — 97535 SELF CARE MNGMENT TRAINING: CPT | Mod: GP

## 2024-11-08 PROCEDURE — 97140 MANUAL THERAPY 1/> REGIONS: CPT | Mod: GP

## 2024-11-08 NOTE — PROGRESS NOTES
"  Physical Therapy Treatment    Patient Name: Keesha Alberto  MRN: [id number]  Today's Date: 2024   Visit     Auth: Approved 16 visits 24-24   Referred by: Dave  Time Calculation  Start Time: 1200  Stop Time: 1255  Time Calculation (min): 55 min  Diagnosis:   1. Lymphedema  Follow Up In Physical Therapy      2. Venous insufficiency  Follow Up In Physical Therapy        Problem List Items Addressed This Visit             ICD-10-CM    Lymphedema I89.0    Venous insufficiency I87.2          PT Therapeutic Procedures Time Entry  Manual Therapy Time Entry: 35  Self-Care/Home Mgmt Trainin                   Precautions:  Venous insufficiency  Hx breast cancer  DM II  Heart arrhythmia    SUBJECTIVE:  Pt has not yet received paperwork/invoice for wraps so they have not yet been shipped.    Pain level: R knee \" a little more achey\" 1-2/10    HEP compliance: good wearing knee high compression socks she has.    OBJECTIVE:  Objective                 Lymphedema Assessments:       Right Lower Extremity:  R Metatarsal (cm): 9.1 cm  R Heel Y Angle: 23.7 cm  R Ankle (cm): 28.7 cm  R 10 cm Above Ankle (cm): 25.1 cm  R 20 cm Above Ankle (cm): 34.7 cm  R 30 cm Above Ankle (cm): 42.1 cm  R 40 cm Above Ankle (cm): 0 cm  R Knee (cm): 41 cm  R 10 cm Above Knee (cm): 51.1 cm  R 20 cm Above Knee (cm): 0 cm  R 30 cm Above Knee (cm): 0 cm  R 40 cm Above Knee (cm): 0 cm  R 50 cm Above Knee (cm): 0 cm  Right lower extremity total: 255.5  Left Lower Extremity:  L Metatarsal (cm): 8.3 cm  L Heel Y Angle: 22.7 cm  L Ankle (cm): 29.2 cm  L 10 cm Above Ankle (cm): 25.3 cm  L 20 cm Above Ankle (cm): 35.5 cm  L 30 cm Above Ankle (cm): 40.2 cm  L 40 cm Above Ankle (cm): 0 cm  L Knee (cm): 38.7 cm  L 10 cm Above Knee (cm): 50.3 cm  L 20 cm Above Knee (cm): 0 cm  L 30 cm Above Knee (cm): 0 cm  L 40 cm Above Knee (cm): 0 cm  L 50 cm Above Knee (cm): 0 cm  Left Lower extremity total: 250.2  LE Skin Appearance/Condition and Girth:   " Girth increased bilaterally         Treatment:            - Manual Therapy:  MLD to trunk and B LE's x 3  - Self Care:  Edu on the impacts of salt or decreased activity. Edu also on compression stockings can get to tight at the top not letting lymph fluid through and to adjust band frequently.  Edu that inflammation in knee joints can increase fluid in Le's since it has to move distally before it can move back proximally to the heart.    ASSESSMENT:  Patient presents with Secondary Lymphedema stage 2, resulting in bilateral leg lymphedema . Increased girth bilaterally this date. R LE increase above the compression level but L LE increased throughout.     DME: pt still has not received invoice to get wraps.    PLAN:  Consider tubigrip over compression stockings for added compression. Use MLD or KT tape to reduce girth in LE's.      GOALS:  Active       PT Problem       Pt will have improved LLIS by 10% or greater indicating improving function       Start:  09/05/24    Expected End:  03/01/25            Pt will have reduced girth in B LE's by 10-20 cm total girth for improved LE shape and fit of compression garments.       Start:  09/05/24    Expected End:  03/01/25            Pt will be independent with self MLD to promote improved lymph flow.       Start:  09/05/24    Expected End:  03/01/25            Pt will be independent with donning inelastic and compression garments for optimal girth reduction.       Start:  09/05/24    Expected End:  03/01/25

## 2024-11-12 ENCOUNTER — TREATMENT (OUTPATIENT)
Dept: PHYSICAL THERAPY | Facility: CLINIC | Age: 79
End: 2024-11-12
Payer: COMMERCIAL

## 2024-11-12 DIAGNOSIS — I89.0 LYMPHEDEMA: ICD-10-CM

## 2024-11-12 DIAGNOSIS — I87.2 VENOUS INSUFFICIENCY: ICD-10-CM

## 2024-11-12 PROCEDURE — 97140 MANUAL THERAPY 1/> REGIONS: CPT | Mod: GP

## 2024-11-12 PROCEDURE — 97535 SELF CARE MNGMENT TRAINING: CPT | Mod: GP

## 2024-11-12 NOTE — PROGRESS NOTES
Physical Therapy Treatment    Patient Name: Keesha Alberto  MRN: [id number]  Today's Date: 11/12/2024   Visit   7/16  Auth: Approved 16 visits 9/5/24-11/28/24   Referred by: Dave       Diagnosis:   1. Lymphedema  Follow Up In Physical Therapy      2. Venous insufficiency  Follow Up In Physical Therapy        Problem List Items Addressed This Visit             ICD-10-CM    Lymphedema I89.0    Venous insufficiency I87.2                              Precautions:  Venous insufficiency  Hx breast cancer  DM II  Heart arrhythmia    SUBJECTIVE:  Pt comes in today with regular socks on, has not worn compression today but notes her legs feel better and she was able to walk to a doctors appointment from the car and didn't need a w/c because she was feeling better.    Pain level: R knee 1/10 achey    HEP compliance: fair    OBJECTIVE:  Objective                 Lymphedema Assessments:        Right Lower Extremity:  R Metatarsal (cm): 9.1 cm  R Heel Y Angle: 23.2 cm  R Ankle (cm): 27.9 cm  R 10 cm Above Ankle (cm): 26.3 cm  R 20 cm Above Ankle (cm): 36.6 cm  R 30 cm Above Ankle (cm): 41.9 cm  R 40 cm Above Ankle (cm): 0 cm  R Knee (cm): 41.8 cm  R 10 cm Above Knee (cm): 50.6 cm  R 20 cm Above Knee (cm): 0 cm  R 30 cm Above Knee (cm): 0 cm  R 40 cm Above Knee (cm): 0 cm  R 50 cm Above Knee (cm): 0 cm  Right lower extremity total: 257.4  Left Lower Extremity:  L Metatarsal (cm): 8.3 cm  L Heel Y Angle: 22.6 cm  L Ankle (cm): 27.3 cm  L 10 cm Above Ankle (cm): 25.3 cm  L 20 cm Above Ankle (cm): 33.9 cm  L 30 cm Above Ankle (cm): 40.7 cm  L 40 cm Above Ankle (cm): 0 cm  L Knee (cm): 39.8 cm  L 10 cm Above Knee (cm): 50.2 cm  L 20 cm Above Knee (cm): 0 cm  L 30 cm Above Knee (cm): 0 cm  L 40 cm Above Knee (cm): 0 cm  L 50 cm Above Knee (cm): 0 cm  Left Lower extremity total: 248.1  LE Skin Appearance/Condition and Girth:   Good skin mobility, not tight/turgid today.           Treatment:             - Therapeutic Exercise:       -  Manual Therapy:  MLD to trunk and B LE's x  with focus on increased areas and proximal to them    - Self Care:  Issued FDA handout on sodium and how to ID what low or high sodium is on a nutrition labels.  Edu on increased girth common towards the end of the day as evidenced by her measurements also and benefits of putting on compression early in the day.      ASSESSMENT:  Patient presents with Secondary Lymphedema stage 2, resulting in bilateral leg lymphedema . Pt with decreased girth in L LE only. Pt did not wear compression yet.    DME update: pt still has not received invoice to pay from [employer] , therapist sent email to Emily at [facility] this date.    PLAN:  Work on consistent compression, train in velcro wraps once received.      GOALS:  Active       PT Problem       Pt will have improved LLIS by 10% or greater indicating improving function       Start:  09/05/24    Expected End:  03/01/25            Pt will have reduced girth in B LE's by 10-20 cm total girth for improved LE shape and fit of compression garments.       Start:  09/05/24    Expected End:  03/01/25            Pt will be independent with self MLD to promote improved lymph flow.       Start:  09/05/24    Expected End:  03/01/25            Pt will be independent with donning inelastic and compression garments for optimal girth reduction.       Start:  09/05/24    Expected End:  03/01/25

## 2024-11-15 ENCOUNTER — TREATMENT (OUTPATIENT)
Dept: PHYSICAL THERAPY | Facility: CLINIC | Age: 79
End: 2024-11-15
Payer: COMMERCIAL

## 2024-11-15 DIAGNOSIS — I87.2 VENOUS INSUFFICIENCY: ICD-10-CM

## 2024-11-15 DIAGNOSIS — I89.0 LYMPHEDEMA: ICD-10-CM

## 2024-11-15 PROCEDURE — 97140 MANUAL THERAPY 1/> REGIONS: CPT | Mod: GP

## 2024-11-15 PROCEDURE — 97535 SELF CARE MNGMENT TRAINING: CPT | Mod: GP

## 2024-11-15 NOTE — PROGRESS NOTES
Physical Therapy Treatment    Patient Name: Keesha Alberto  MRN: [id number]  Today's Date: 11/15/2024   Visit   8 / 16  Auth: Approved 16 visits 9/5/24-11/28/24   Referred by: Dave  Time Calculation  Start Time: 0845  Stop Time: 0945  Time Calculation (min): 60 min  Diagnosis:   1. Lymphedema  Follow Up In Physical Therapy      2. Venous insufficiency  Follow Up In Physical Therapy        Problem List Items Addressed This Visit             ICD-10-CM    Lymphedema I89.0    Venous insufficiency I87.2          PT Therapeutic Procedures Time Entry  Manual Therapy Time Entry: 45  Self-Care/Home Mgmt Training: 15                   Precautions:  Venous insufficiency  Hx breast cancer  DM II  Heart arrhythmia    SUBJECTIVE:  Wearing compression socks today.    Pain level: R knee achey 1/10    HEP compliance: good    OBJECTIVE:  Objective               Lymphedema Assessments:        Right Lower Extremity:  R Metatarsal (cm): 8.4 cm  R Heel Y Angle: 24.3 cm  R Ankle (cm): 26.7 cm  R 10 cm Above Ankle (cm): 24.1 cm  R 20 cm Above Ankle (cm): 34.6 cm  R 30 cm Above Ankle (cm): 40.6 cm  R 40 cm Above Ankle (cm): 0 cm  R Knee (cm): 40.2 cm  R 10 cm Above Knee (cm): 51.5 cm  R 20 cm Above Knee (cm): 0 cm  R 30 cm Above Knee (cm): 0 cm  R 40 cm Above Knee (cm): 0 cm  R 50 cm Above Knee (cm): 0 cm  Right lower extremity total: 250.4  Decreased 7 cm  Left Lower Extremity:  L Metatarsal (cm): 8.2 cm  L Heel Y Angle: 23.6 cm  L Ankle (cm): 26 cm  L 10 cm Above Ankle (cm): 24.1 cm  L 20 cm Above Ankle (cm): 33.9 cm  L 30 cm Above Ankle (cm): 39.5 cm  L 40 cm Above Ankle (cm): 0 cm  L Knee (cm): 39.1 cm  L 10 cm Above Knee (cm): 48.7 cm  L 20 cm Above Knee (cm): 0 cm  L 30 cm Above Knee (cm): 0 cm  L 40 cm Above Knee (cm): 0 cm  L 50 cm Above Knee (cm): 0 cm  Left Lower extremity total: 243.1  Decreased 5 cm    LE Skin Appearance/Condition and Girth:   Improved skin mobility  in feet and calves.         Treatment:             -  Therapeutic Exercise:     - Manual Therapy:  MLD to trunk and B LE's x 3     - Self Care:  Edu on differences in measurements in morning versus afternoon of 5-7 cm in the legs and importance of consistent compression.  Issued phone number for Emily and Laura to seek out invoice that was to be mailed to pt.    ASSESSMENT:  Patient presents with Secondary Lymphedema stage 2, resulting in bilateral leg lymphedema . A difference of 5-7 cm today seeing pt in the morning versus afternoon.     PLAN:  Train patient in donning velcro wraps once received. Work to reduce girth in LE's.      GOALS:  Active       PT Problem       Pt will have improved LLIS by 10% or greater indicating improving function       Start:  09/05/24    Expected End:  03/01/25            Pt will have reduced girth in B LE's by 10-20 cm total girth for improved LE shape and fit of compression garments.       Start:  09/05/24    Expected End:  03/01/25            Pt will be independent with self MLD to promote improved lymph flow.       Start:  09/05/24    Expected End:  03/01/25            Pt will be independent with donning inelastic and compression garments for optimal girth reduction.       Start:  09/05/24    Expected End:  03/01/25

## 2024-11-16 DIAGNOSIS — I10 ESSENTIAL (PRIMARY) HYPERTENSION: ICD-10-CM

## 2024-11-18 RX ORDER — METOPROLOL TARTRATE 50 MG/1
50 TABLET ORAL
Qty: 180 TABLET | Refills: 2 | Status: SHIPPED | OUTPATIENT
Start: 2024-11-18

## 2024-11-21 ENCOUNTER — TREATMENT (OUTPATIENT)
Dept: PHYSICAL THERAPY | Facility: CLINIC | Age: 79
End: 2024-11-21
Payer: COMMERCIAL

## 2024-11-21 DIAGNOSIS — I87.2 VENOUS INSUFFICIENCY: ICD-10-CM

## 2024-11-21 DIAGNOSIS — I89.0 LYMPHEDEMA: ICD-10-CM

## 2024-11-21 PROCEDURE — 97140 MANUAL THERAPY 1/> REGIONS: CPT | Mod: GP

## 2024-11-21 PROCEDURE — 97535 SELF CARE MNGMENT TRAINING: CPT | Mod: GP

## 2024-11-21 NOTE — PROGRESS NOTES
Physical Therapy Treatment    Patient Name: Keesha Alberto  MRN: [id number]  Today's Date: 2024   Visit   9  16  Auth: Approved 16 visits 24-24   Referred by: Dave  Time Calculation  Start Time: 1200  Stop Time: 1300  Time Calculation (min): 60 min  Diagnosis:   1. Lymphedema  Follow Up In Physical Therapy      2. Venous insufficiency  Follow Up In Physical Therapy        Problem List Items Addressed This Visit             ICD-10-CM    Lymphedema I89.0    Venous insufficiency I87.2          PT Therapeutic Procedures Time Entry  Manual Therapy Time Entry: 52  Self-Care/Home Mgmt Trainin                   Precautions:  Venous insufficiency  Hx breast cancer  DM II  Heart arrhythmia    SUBJECTIVE:  Wearing knee high compression socks.    Pain level: 0/10    HEP compliance: good    OBJECTIVE:  Objective              Lymphedema Assessments:          Right Lower Extremity:  R Metatarsal (cm): 8.9 cm  R Heel Y Angle: 23.1 cm  R Ankle (cm): 27 cm  R 10 cm Above Ankle (cm): 24.5 cm  R 20 cm Above Ankle (cm): 35.1 cm  R 30 cm Above Ankle (cm): 40.9 cm  R 40 cm Above Ankle (cm): 0 cm  R Knee (cm): 40.4 cm  R 10 cm Above Knee (cm): 48.7 cm  R 20 cm Above Knee (cm): 0 cm  R 30 cm Above Knee (cm): 0 cm  R 40 cm Above Knee (cm): 0 cm  R 50 cm Above Knee (cm): 0 cm  Right lower extremity total: 248.6  Decreased 2 cm  Left Lower Extremity:  L Metatarsal (cm): 8.2 cm  L Heel Y Angle: 22.9 cm  L Ankle (cm): 25.6 cm  L 10 cm Above Ankle (cm): 23.6 cm  L 20 cm Above Ankle (cm): 33.6 cm  L 30 cm Above Ankle (cm): 39.7 cm  L 40 cm Above Ankle (cm): 0 cm  L Knee (cm): 37.8 cm  L 10 cm Above Knee (cm): 49 cm  L 20 cm Above Knee (cm): 0 cm  L 30 cm Above Knee (cm): 0 cm  L 40 cm Above Knee (cm): 0 cm  L 50 cm Above Knee (cm): 0 cm  Left Lower extremity total: 240.4  Decreased 3 cm  LE Skin Appearance/Condition and Girth:                 Treatment:             - Self Care:  Edu on measurements plateauing and importance of  consistently wearing compression.     - Manual Therapy:  MLD to trunk and B LE's x 4       ASSESSMENT:  Patient presents with Secondary Lymphedema stage 2, resulting in bilateral leg lymphedema . Swelling decreased this date despite being later in the day than last visit.    PLAN:  Reassess goals for dc.      GOALS:  Active       PT Problem       Pt will have improved LLIS by 10% or greater indicating improving function       Start:  09/05/24    Expected End:  03/01/25            Pt will have reduced girth in B LE's by 10-20 cm total girth for improved LE shape and fit of compression garments.       Start:  09/05/24    Expected End:  03/01/25            Pt will be independent with self MLD to promote improved lymph flow.       Start:  09/05/24    Expected End:  03/01/25            Pt will be independent with donning inelastic and compression garments for optimal girth reduction.       Start:  09/05/24    Expected End:  03/01/25

## 2024-11-25 ENCOUNTER — TREATMENT (OUTPATIENT)
Dept: PHYSICAL THERAPY | Facility: CLINIC | Age: 79
End: 2024-11-25
Payer: COMMERCIAL

## 2024-11-25 DIAGNOSIS — I87.2 VENOUS INSUFFICIENCY: ICD-10-CM

## 2024-11-25 DIAGNOSIS — I89.0 LYMPHEDEMA: ICD-10-CM

## 2024-11-25 PROCEDURE — 97535 SELF CARE MNGMENT TRAINING: CPT | Mod: GP

## 2024-11-25 PROCEDURE — 97140 MANUAL THERAPY 1/> REGIONS: CPT | Mod: GP

## 2024-11-25 NOTE — PROGRESS NOTES
Physical Therapy Treatment/ Discharge    Patient Name: Keesha Alberto  MRN: [id number]  Today's Date: 2024   Visit   10 / 16  Auth: Approved 16 visits 24-24   Referred by: Dave  Time Calculation  Start Time: 0845  Stop Time: 930  Time Calculation (min): 45 min  Diagnosis:   1. Lymphedema  Follow Up In Physical Therapy      2. Venous insufficiency  Follow Up In Physical Therapy        Problem List Items Addressed This Visit             ICD-10-CM    Lymphedema I89.0    Venous insufficiency I87.2          PT Therapeutic Procedures Time Entry  Manual Therapy Time Entry: 37  Self-Care/Home Mgmt Trainin                   Precautions:  Venous insufficiency  Hx breast cancer  DM II  Heart arrhythmia    SUBJECTIVE:  Pt compliant with knee high compression socks  Pain level: 0/10    HEP compliance: good    OBJECTIVE:  Objective               Lymphedema Assessments:        Upper Extremity Evaluation:     Right Lower Extremity:  R Metatarsal (cm): 8.9 cm  R Heel Y Angle: 23.5 cm  R Ankle (cm): 27.3 cm  R 10 cm Above Ankle (cm): 25.4 cm  R 20 cm Above Ankle (cm): 36.3 cm  R 30 cm Above Ankle (cm): 41.2 cm  R 40 cm Above Ankle (cm): 0 cm  R Knee (cm): 40.4 cm  R 10 cm Above Knee (cm): 50.8 cm (over clothes)  R 20 cm Above Knee (cm): 0 cm  R 30 cm Above Knee (cm): 0 cm  R 40 cm Above Knee (cm): 0 cm  R 50 cm Above Knee (cm): 0 cm  Right lower extremity total: 253.8  Increased 5 cm    Left Lower Extremity:  L Metatarsal (cm): 8.1 cm  L Heel Y Angle: 22.5 cm  L Ankle (cm): 27 cm  L 10 cm Above Ankle (cm): 24.6 cm  L 20 cm Above Ankle (cm): 35.2 cm  L 30 cm Above Ankle (cm): 40.2 cm  L 40 cm Above Ankle (cm): 0 cm  L Knee (cm): 38.4 cm  L 10 cm Above Knee (cm): 49.1 cm  L 20 cm Above Knee (cm): 0 cm  L 30 cm Above Knee (cm): 0 cm  L 40 cm Above Knee (cm): 0 cm  L 50 cm Above Knee (cm): 0 cm  Left Lower extremity total: 245.1  Increased nearly 5 cm  LE Skin Appearance/Condition and Girth:   Some skin dryness. Pt  with good definition of ankles.         Outcome Measures:    Other Measures  Lymphedema Life Impact Scale (LLIS): 17.64    Treatment:       - Self Care:  Edu on marking knee high compression L and R so the right leg does not stretch out both knee highs.     - Manual Therapy:  MLD to trunk and B LE's x 4       ASSESSMENT:  Patient presents with Secondary Lymphedema stage 2, resulting in bilateral leg lymphedema. Pt has been consistent with wearing knee high compression stockings. It has not seemed pertinent to order velcro compression wraps at this time but pt may benefit from this in the future. Pt has made some progress with goals. Pt is independent with HEP. Will discharge at this time and pt agreeable.    PLAN:  discharge      GOALS:  Active       PT Problem       Pt will have improved LLIS by 10% or greater indicating improving function (Met)       Start:  09/05/24    Expected End:  03/01/25    Resolved:  11/25/24         Pt will have reduced girth in B LE's by 10-20 cm total girth for improved LE shape and fit of compression garments. (Progressing)       Start:  09/05/24    Expected End:  03/01/25            Pt will be independent with self MLD to promote improved lymph flow. (Met)       Start:  09/05/24    Expected End:  03/01/25    Resolved:  11/25/24         Pt will be independent with donning inelastic and compression garments for optimal girth reduction. (Met)       Start:  09/05/24    Expected End:  03/01/25    Resolved:  11/25/24

## 2025-08-18 DIAGNOSIS — I48.91 ATRIAL FIBRILLATION, UNSPECIFIED TYPE (MULTI): ICD-10-CM

## 2025-08-18 RX ORDER — DILTIAZEM HYDROCHLORIDE 300 MG/1
300 CAPSULE, COATED, EXTENDED RELEASE ORAL DAILY
Qty: 90 CAPSULE | Refills: 3 | Status: SHIPPED | OUTPATIENT
Start: 2025-08-18

## 2025-08-20 ENCOUNTER — OFFICE VISIT (OUTPATIENT)
Dept: CARDIOLOGY | Facility: CLINIC | Age: 80
End: 2025-08-20
Payer: MEDICARE

## 2025-08-20 VITALS
SYSTOLIC BLOOD PRESSURE: 151 MMHG | DIASTOLIC BLOOD PRESSURE: 68 MMHG | HEART RATE: 69 BPM | WEIGHT: 213 LBS | BODY MASS INDEX: 32.39 KG/M2

## 2025-08-20 DIAGNOSIS — E78.2 MIXED HYPERLIPIDEMIA: ICD-10-CM

## 2025-08-20 DIAGNOSIS — Z86.711 HISTORY OF PULMONARY EMBOLISM: ICD-10-CM

## 2025-08-20 DIAGNOSIS — I10 ESSENTIAL (PRIMARY) HYPERTENSION: ICD-10-CM

## 2025-08-20 DIAGNOSIS — I48.91 ATRIAL FIBRILLATION, UNSPECIFIED TYPE (MULTI): ICD-10-CM

## 2025-08-20 DIAGNOSIS — I48.21 PERMANENT ATRIAL FIBRILLATION (MULTI): ICD-10-CM

## 2025-08-20 DIAGNOSIS — I48.20 CHRONIC ATRIAL FIBRILLATION (MULTI): ICD-10-CM

## 2025-08-20 DIAGNOSIS — I89.0 LYMPHEDEMA: Primary | ICD-10-CM

## 2025-08-20 PROCEDURE — 1036F TOBACCO NON-USER: CPT | Performed by: INTERNAL MEDICINE

## 2025-08-20 PROCEDURE — 3077F SYST BP >= 140 MM HG: CPT | Performed by: INTERNAL MEDICINE

## 2025-08-20 PROCEDURE — 1160F RVW MEDS BY RX/DR IN RCRD: CPT | Performed by: INTERNAL MEDICINE

## 2025-08-20 PROCEDURE — 99213 OFFICE O/P EST LOW 20 MIN: CPT | Performed by: INTERNAL MEDICINE

## 2025-08-20 PROCEDURE — 99212 OFFICE O/P EST SF 10 MIN: CPT

## 2025-08-20 PROCEDURE — 1159F MED LIST DOCD IN RCRD: CPT | Performed by: INTERNAL MEDICINE

## 2025-08-20 PROCEDURE — 3078F DIAST BP <80 MM HG: CPT | Performed by: INTERNAL MEDICINE
